# Patient Record
Sex: MALE | Race: BLACK OR AFRICAN AMERICAN | NOT HISPANIC OR LATINO | Employment: UNEMPLOYED | URBAN - METROPOLITAN AREA
[De-identification: names, ages, dates, MRNs, and addresses within clinical notes are randomized per-mention and may not be internally consistent; named-entity substitution may affect disease eponyms.]

---

## 2023-10-17 ENCOUNTER — OFFICE VISIT (OUTPATIENT)
Dept: ORTHOPEDIC SURGERY | Facility: CLINIC | Age: 53
End: 2023-10-17
Payer: COMMERCIAL

## 2023-10-17 DIAGNOSIS — S66.811A FLEXOR TENDON RUPTURE OF HAND, RIGHT, INITIAL ENCOUNTER: Primary | ICD-10-CM

## 2023-10-17 DIAGNOSIS — G56.01 CARPAL TUNNEL SYNDROME OF RIGHT WRIST: ICD-10-CM

## 2023-10-17 PROCEDURE — 99203 OFFICE O/P NEW LOW 30 MIN: CPT | Performed by: STUDENT IN AN ORGANIZED HEALTH CARE EDUCATION/TRAINING PROGRAM

## 2023-10-17 NOTE — PROGRESS NOTES
"[facility]   Hand and Upper Extremity Service  Initial evaluation / Consultation          Chief Complaint: Right hand pain and right hand numbness and tingling.    Patient is a pleasant 53 male.  He states that he injured his right small finger approximately 6 months ago.  He he states \" that his finger bent the wrong way.\"  He had immediate pain at that time.  He is continue to have pain to the right small finger.  He is unable to flex the DIP of the small finger.  The pain radiates his forearm.  He also complains of numbness and tingling in his right hand.  The numbness is tingling is to all fingers.  Patient had an MRI on 9/19/2023 which demonstrate FDP rupture to the small finger.  He denies any other injuries or acute complaints.             Please refer to New Patient Intake Form scanned into patient's electronic record for self reported past medical history, past surgical history, medications, allergies, family history, social history and 10 point review of systems     Examination:  Constitutional: Oriented to person, place, and time.  Appears well-developed and well-nourished.  Head: Normocephalic and atraumatic.  Eyes: Pupils are equal, round, and reactive to light.  Cardiovascular: Intact distal pulses.   Pulmonary/Chest/Breast: Effort normal. No respiratory distress.  Neurological: Alert and oriented to person, place, and time.  Skin: Skin is warm and dry.  Psychiatric: normal mood and affect.  Behavior is normal.  Musculoskeletal: Right upper extremity:  Tender to palpation to the right small finger over the DIP and middle phalanx.  Nontender over the PIP.  No instability to varus or valgus stress at the PIP joint.  There is no ecchymosis or swelling present.  He is able to flex and extend the MP and PIP joints of all fingers.  FDS is intact with FDS testing.  He is unable to bend the DIP of his small finger.  AIN, PIN, ulnar motor nerves intact.  Sensation intact to " light touch to the median, ulnar, radial nerves.  He has a positive Tinel's over the carpal tunnel negative over the cubital tunnel.  Positive Phalen's then positive Mary's however the numbness goes to all fingers.     Personal Interpretation of Diagnostic studies: MRI of the right hand dated 9/19/2023 was reviewed.  This is consistent with an FDP rupture to the right small finger.         Impression: FDP rupture of the right small finger and hand numbness         In Office Procedures Performed: None         Medical Decision Making:   We discussed treatment options with the patient.  His biggest concern is pain at this time over the right small finger.  We discussed obtaining an EMG/nerve conduction study to evaluate for carpal tunnel and other nerve entrapments.  We discussed that carpal tunnel surgery may relieve his numbness and tingling and carpal tunnel surgery has a good outcome with a short recovery time.    The patient also sustained an FDP rupture to his right small finger.  However this was approximately 6 months ago.  A single-stage repair or reconstruction would likely not be possible.  We did discuss a two-stage procedure.  I discussed that this is a long recovery with extensive rehab.  We would also have to take a tendon for graft from another part of his body.  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  I discussed with him that this procedure would likely not improve his pain but would allow him to flex his DIP joint of his right small finger.  I also discussed that this surgery lead to stiffness of his right small finger.  He currently has an FDS finger of his right small finger.  This is a fairly functional finger with good range of motion of the PIP joint and I cannot guarantee him that surgery would lead to an improved overall function of his hand.  Patient understands this and he is going to obtain an EMG.  We will see him back after his nerve conduction study.         Medications Prescribed:  None        Follow up: After EMG/nerve conduction study.           Will Beucler  [facility]  Department of Orthopaedic Surgery  Hand and Upper Extremity Reconstruction           Dictation performed with the use of voice recognition software.  Syntax and grammatical errors may exist.

## 2024-09-10 ENCOUNTER — APPOINTMENT (OUTPATIENT)
Dept: OTOLARYNGOLOGY | Facility: CLINIC | Age: 54
End: 2024-09-10
Payer: COMMERCIAL

## 2024-09-10 VITALS
SYSTOLIC BLOOD PRESSURE: 112 MMHG | DIASTOLIC BLOOD PRESSURE: 71 MMHG | WEIGHT: 215 LBS | HEIGHT: 71 IN | TEMPERATURE: 97.5 F | BODY MASS INDEX: 30.1 KG/M2

## 2024-09-10 DIAGNOSIS — R49.0 HOARSENESS: Primary | ICD-10-CM

## 2024-09-10 DIAGNOSIS — K21.9 LARYNGOPHARYNGEAL REFLUX (LPR): ICD-10-CM

## 2024-09-10 DIAGNOSIS — Z72.0 TOBACCO ABUSE: ICD-10-CM

## 2024-09-10 DIAGNOSIS — J38.1 REINKE'S EDEMA OF VOCAL FOLDS: ICD-10-CM

## 2024-09-10 PROCEDURE — 99204 OFFICE O/P NEW MOD 45 MIN: CPT | Performed by: OTOLARYNGOLOGY

## 2024-09-10 PROCEDURE — 31575 DIAGNOSTIC LARYNGOSCOPY: CPT | Performed by: OTOLARYNGOLOGY

## 2024-09-10 PROCEDURE — 4004F PT TOBACCO SCREEN RCVD TLK: CPT | Performed by: OTOLARYNGOLOGY

## 2024-09-10 PROCEDURE — 3008F BODY MASS INDEX DOCD: CPT | Performed by: OTOLARYNGOLOGY

## 2024-09-10 NOTE — PROGRESS NOTES
Impression:  1. Hoarseness        2. Mariah's edema of vocal folds        3. Laryngopharyngeal reflux (LPR)        4. Tobacco abuse             RECOMMENDATIONS/PLAN :  I reassured the patient that his previous ENT has a good plan for him and I would recommend that he follow through with a direct laryngoscopy with biopsies of his vocal cords.  I want him to continue his reflux medication as directed and this will keep acid away from the vocal cords.  We had a lengthy discussion regarding the fact that he absolutely must quit smoking sooner than later as well.  Eventually he may need speech therapy as well.      **This electronic medical record note was created with the use of voice recognition software.  Despite proofreading, typographical or grammatical errors may be present that could affect meaning of content **    Subjective   Patient ID:     Alok Lieberman is a 54 y.o. male who presents to the office today for a second opinion.  He has had hoarseness on and off for several months.  Currently he is smoking about 6 cigarettes/day and he is trying to quit.  We had a lengthy discussion regarding the fact that he absolutely must quit sooner than later.  He denies alcohol abuse.  He denies any hemoptysis or hematemesis.  He has been taking reflux medication as well as Flonase nasal spray to help with postnasal drip.  He denies any swelling in his neck or any secondary symptoms such as unexplained loss of weight night sweats or fatigue.  His previous ENT recommended a direct laryngoscopy with biopsies of the vocal cords and possibly speech therapy.    ROS:  A detailed 12 system review of systems is noted on the intake form has been reviewed with the patient with details noted in the HPI and scanned into the patient's medical record.    Objective     Past Medical History:   Diagnosis Date    Unspecified injury of unspecified eye and orbit, initial encounter 06/16/2014    Trauma to eye    Vitamin D deficiency, unspecified  "10/01/2015    Vitamin D deficiency        Past Surgical History:   Procedure Laterality Date    EYE SURGERY  06/16/2014    Eye Surgery    OTHER SURGICAL HISTORY  06/16/2014    Facial Surgery        No Known Allergies     No current outpatient medications on file.     Tobacco Use: High Risk (9/10/2024)    Patient History     Smoking Tobacco Use: Every Day     Smokeless Tobacco Use: Never     Passive Exposure: Not on file        Alcohol Use: Not on file        Social History     Substance and Sexual Activity   Drug Use Not on file        Physical Exam:  Visit Vitals  /71   Temp 36.4 °C (97.5 °F) (Temporal)   Ht 1.803 m (5' 11\")   Wt 97.5 kg (215 lb)   BMI 29.99 kg/m²   Smoking Status Every Day   BSA 2.21 m²      General: Patient is alert, oriented, cooperative in no apparent distress.  Head: Normocephalic, atraumatic.  Eyes: PERRL, EOMI, Conjunctiva is clear. No nystagmus.  Ears: Right Ear-- Pinna is normal.  External auditory canal is patent. Tympanic membrane is [intact, translucent and has good mobility with my pneumatic otoscope. No effusion].  Mastoid is nontender.  Left ear-- Pinna is normal.  External auditory canal is patent. Tympanic membrane is [intact, translucent and has good mobility with my pneumatic otoscope.  No effusion].  Mastoid is nontender.  Nose: Septum is relatively straight.  No septal perforation or lesions. No septal hematoma/ seroma.  No signs of bleeding.  Inferior turbinates are normal.   No evidence of intranasal polyps.  No infectious drainage.  Throat:  Floor of mouth is clear, no masses.  Tongue appears normal, no lesions or masses. Gums, gingiva, buccal mucosa appear pink and moist, no lesions. Teeth are in good repair.  No obvious dental infections.  Peritonsillar regions appear symmetric without swelling.  Hard and soft palate appear normal, no obvious cleft. Uvula is midline.  Oropharynx: No lesions. Retropharyngeal wall is flat.  No active postnasal drip.  Neck: Supple,  no " lymphadenopathy.  No masses.  Salivary Glands: Symmetric bilaterally.  No palpable masses.  No evidence of acute infection or salivary stones  Neurologic: Cranial Nerves 2-12 are grossly intact without focal deficits. Cerebellar function testing is normal.     Results:   []    Procedure:   Pre Op Diagnosis: Hoarseness-rule out vocal cord lesions  Post Op Diagnosis: Same  Procedure: Flexible Nasopharyngolaryngoscopy  Surgeon: Sergio Dinh DO  Assist: None  Anesthesia: Topical Lidocaine 4%/ 0.05% Afrin 1:1 mixture  EBL: None  Complications: None  Disposition: The patient tolerated the procedure well. There were no complications.    Procedure:  After informed consent was obtained with the risks, benefits, complications and alternatives explained to the patient/ guardian, the patient was sat up in the ENT chair and the nose was anesthetized and decongested with topical  4% lidocaine and 0.05% Afrin. After allowing this to take effect, the flexible nasopharyngoscope was advanced thru the nostrils and down to the larynx. The following areas were visualized:  The nasal passages, septum, nasopharynx, sinus ostia, base of tongue, epiglottis, true and false vocal folds, arytenoids, post cricoid region and subglottis were all examined and found to be normal except as follows:  Septum is relatively straight.  His ostiomeatal complexes are clear bilaterally.  No pus polyps or lesions.  Nasopharynx is clear.  No masses.  Base of tongue clear.  Epiglottis is normal.  True and false vocal cords are approximating equally bilaterally.  He has significant Mariah's edema of his true vocal cords.  No evidence of any weakness.  He has moderate erythema along his left true vocal cord as well.  No obvious ulcerative masses noted.  Arytenoids show moderate edema consistent with silent reflux/laryngopharyngeal reflux.  Subglottis appears clear.      The patient tolerated the procedure well and there were no complications.      Sergio Dinh  DO

## 2025-04-28 ENCOUNTER — OFFICE VISIT (OUTPATIENT)
Dept: PAIN MEDICINE | Facility: HOSPITAL | Age: 55
End: 2025-04-28
Payer: COMMERCIAL

## 2025-04-28 DIAGNOSIS — M48.02 FORAMINAL STENOSIS OF CERVICAL REGION: Primary | ICD-10-CM

## 2025-04-28 DIAGNOSIS — M47.812 CERVICAL SPONDYLOSIS WITHOUT MYELOPATHY: ICD-10-CM

## 2025-04-28 DIAGNOSIS — M54.12 CERVICAL RADICULOPATHY: ICD-10-CM

## 2025-04-28 PROCEDURE — 99214 OFFICE O/P EST MOD 30 MIN: CPT | Performed by: PAIN MEDICINE

## 2025-04-28 PROCEDURE — 99204 OFFICE O/P NEW MOD 45 MIN: CPT | Performed by: PAIN MEDICINE

## 2025-04-28 RX ORDER — OXYCODONE AND ACETAMINOPHEN 5; 325 MG/1; MG/1
1 TABLET ORAL EVERY 6 HOURS PRN
Qty: 10 TABLET | Refills: 0 | Status: SHIPPED | OUTPATIENT
Start: 2025-04-28 | End: 2025-05-03

## 2025-04-28 NOTE — PROGRESS NOTES
Subjective   Patient ID: Alok Lieberman is a 54 y.o. male with a past medical history of HLD, Anxiety and neck and shoulder pain, who presents today for neck and shoulder pain.     Today, patient reports that he was previously seen with Dr. Driver and received a C6/C7 epidural steroid injection last in August 2021.     He notes that he was been pain free for the last ~2.5 years, and the pain is starting to come back. This is 7/10 and starting in his left neck and radiating to his left shoulder. He does not have any radiation down to his hands. This pain gets worse when looking upwards or turning his head to the left.     Physical Therapy: The patient has not done physical therapy within the past six months  Other Conservative Measures he has tried: Heating Pad, Ice, and Injections  Classes of medications tried in the past: Acetaminophen and NSAIDs    Review of Systems   13-point ROS done and negative except for HPI.     No current outpatient medications    Medical History[1]     Surgical History[2]     Family History[3]     RX Allergies[4]     Objective     There were no vitals filed for this visit.     Physical Exam  General: NAD, well groomed, well nourished  Eyes: Non-icteric sclera, EOMI  Ears, Nose, Mouth, and Throat: External ears and nose appear to be without deformity or rash. No lesions or masses noted. Hearing is grossly intact.   Neck: Trachea midline  Spurling test positive on left  Respiratory: Nonlabored breathing   Cardiovascular: no peripheral edema   Skin: No rashes or open lesions/ulcers identified on skin.    Back:   Palpation: No tenderness to palpation over lumbar paraspinous muscles. Tenderness in left cervical region    Neurologic:   Cranial nerves grossly intact.   Strength 5/5 and symmetric plantar/dorsiflexion   Sensation: Normal to light touch throughout  DTRs:normal and symmetric throughout  Torres: absent  Clonus: absent  Uses Assist Device: no    Psychiatric: Alert, orientation to  person, place, and time. Cooperative.    Imaging   4/9/21 MRI Cervical Spine  C3-C4 moderate-to-marked left neural foraminal  narrowing due to uncovertebral hypertrophy.    Multilevel degenerative changes of the cervical spine, most  pronounced at the level of C6-C7 where there is mild spinal canal  stenosis    Assessment/Plan   Alok Lieberman is a 54 y.o. male with a past medical history of HLD, Anxiety and neck and shoulder pain, who presents today for neck and shoulder pain.     Most likely recurrence of left C4 radiculopathy.     Plan:  - Gabapentin 300mg nightly  - 5 day course of percocet  - Left C3-C4 transforaminal epidural steroid injection  - referral for surgical evaluation    We discussed  the risks, benefits and alternatives of the procedure including but not limited to: , Lack of efficacy , Transiently worsening pain , Bleeding, Infection , and Nerve Damage    Follow up: After procedure    The patient was invited to contact us back anytime with any questions or concerns and follow-up with us in the office as needed.     This note was generated with the aid of dictation software, there may be typos despite my attempts at proofreading.   The patient was discussed and seen with Dr. Pimentel.    Rafael Alonso, DO PGY-1        [1]   Past Medical History:  Diagnosis Date    Unspecified injury of unspecified eye and orbit, initial encounter 06/16/2014    Trauma to eye    Vitamin D deficiency, unspecified 10/01/2015    Vitamin D deficiency   [2]   Past Surgical History:  Procedure Laterality Date    EYE SURGERY  06/16/2014    Eye Surgery    OTHER SURGICAL HISTORY  06/16/2014    Facial Surgery   [3] No family history on file.  [4] No Known Allergies

## 2025-05-12 DIAGNOSIS — M54.16 LUMBAR RADICULOPATHY: ICD-10-CM

## 2025-05-13 ENCOUNTER — APPOINTMENT (OUTPATIENT)
Facility: HOSPITAL | Age: 55
End: 2025-05-13
Payer: COMMERCIAL

## 2025-05-13 DIAGNOSIS — M54.2 CERVICALGIA: ICD-10-CM

## 2025-05-13 RX ORDER — OXYCODONE AND ACETAMINOPHEN 5; 325 MG/1; MG/1
1 TABLET ORAL 2 TIMES DAILY PRN
Qty: 10 TABLET | Refills: 0 | Status: SHIPPED | OUTPATIENT
Start: 2025-05-13 | End: 2025-05-18

## 2025-05-23 ENCOUNTER — TRANSCRIBE ORDERS (OUTPATIENT)
Dept: ORTHOPEDIC SURGERY | Facility: HOSPITAL | Age: 55
End: 2025-05-23
Payer: COMMERCIAL

## 2025-05-23 DIAGNOSIS — M54.2 NECK PAIN: ICD-10-CM

## 2025-05-27 ENCOUNTER — OFFICE VISIT (OUTPATIENT)
Dept: ORTHOPEDIC SURGERY | Facility: CLINIC | Age: 55
End: 2025-05-27
Payer: COMMERCIAL

## 2025-05-27 ENCOUNTER — HOSPITAL ENCOUNTER (OUTPATIENT)
Dept: RADIOLOGY | Facility: CLINIC | Age: 55
Discharge: HOME | End: 2025-05-27
Payer: COMMERCIAL

## 2025-05-27 DIAGNOSIS — M54.12 CERVICAL RADICULOPATHY: ICD-10-CM

## 2025-05-27 DIAGNOSIS — M54.2 NECK PAIN: ICD-10-CM

## 2025-05-27 PROCEDURE — 99213 OFFICE O/P EST LOW 20 MIN: CPT | Performed by: ORTHOPAEDIC SURGERY

## 2025-05-27 PROCEDURE — 72050 X-RAY EXAM NECK SPINE 4/5VWS: CPT | Performed by: RADIOLOGY

## 2025-05-27 PROCEDURE — 72050 X-RAY EXAM NECK SPINE 4/5VWS: CPT

## 2025-05-27 ASSESSMENT — PAIN SCALES - GENERAL: PAINLEVEL_OUTOF10: 9

## 2025-05-27 ASSESSMENT — PAIN - FUNCTIONAL ASSESSMENT: PAIN_FUNCTIONAL_ASSESSMENT: 0-10

## 2025-05-27 NOTE — PROGRESS NOTES
HPI:Alok Lieberman is a pleasant 54-year-old man, who comes in with complaints of a year worth of neck pain.  The pain radiates into the left shoulder with associated numbness and tingling.  He has not had recent physical therapy.  He denies myelopathic symptoms.      ROS:  Reviewed on EMR and patient intake sheet.    PMH/SH:   Reviewed on EMR and patient intake sheet.    Exam:  Physical Exam    Constitutional: Well appearing; no acute distress  Eyes: pupils are equal and round  Psych: normal affect  Respiratory: non-labored breathing  Cardiovascular: regular rate and rhythm  GI: non-distended abdomen  Musculoskeletal: no pain with range of motion of the shoulders bilaterally; no signs of impingement  Neurologic: [5]/5 strength in the upper extremities bilaterally]; [negative] Torres's; [no hyper-reflexia]    Radiology:  X-rays demonstrate moderate disc degeneration at C5-6 and C6-C7    Diagnosis:  Cervical radiculopathy    Assessment and Plan:   54-year-old man with symptomatic cervical radiculopathy.  At this time we will begin with physical therapy and cervical traction.  If his symptoms persist, then MRI and follow-up would be appropriate.    At the end of the visit, I asked the patient if there were any further questions.  Although no further questions were provided at this time, I would be happy to see the patient back in the future to discuss any further questions or concerns.    Shalom Cruz MD    Chief of Spine Surgery, [facility]  Director of Spine Service, [facility]  , Department of Orthopaedics  [facility]  [address] [place] [address]  P: [phone]  [facility]    This note was dictated with voice recognition software.  It has not been proofread for grammatical errors, typographical mistakes or other semantic inconsistencies.